# Patient Record
(demographics unavailable — no encounter records)

---

## 2024-10-24 NOTE — HISTORY OF PRESENT ILLNESS
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 She is currently on azithromycin for 5 days. She is on budesonide nebulizers 3 times daily and levalbuterol nebulizers. Reactive airway disease for long time but no formal diagnosis of asthma. Longstanding history of tachycardia. Prior history of COVID infection complicated by chronic fatigue. No recent COVID infection she had home testing that was negative.

## 2024-10-24 NOTE — ASSESSMENT
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 Respiratory viral panel was done that was negative. Patient is currently on azithromycin  She continues to have harsh cough that interferes with sleep work and has caused chest discomfort. I ordered a chest x-ray she has history of tachycardia she takes [place] Thyroid and also takes medications for ADHD  In the meantime patient will continue azithromycin Short burst of oral prednisone for reactive airway Hold off budesonide because of risk of oral thrush and laryngitis Continue levalbuterol    . Today I reviewed the chest x-ray I discussed result with the patient there is silhouetting of the right diaphragmatic border consistent with pulmonary infiltrate and pneumonia Problems. Right lower lobe infiltrate likely pneumonia RVP panel is negative Underlying history of/asthma/reactive airway  chronic tachycardia  Recommendations. Extend antibiotic duration I will start her on Augmentin for 7 days  continue oral prednisone for 5 days Cough suppressant with codeine every nightly for 3 days, risks and benefits discussed Hold budesonide Continue albuterol I offered follow-up CT scan of the chest, patient would like to hold off CT scan but instead do a chest x-ray. She has family history of lung cancer in her mother Short interval follow-up in 6 weeks or earlier if symptomatic Patient is traveling to [place] Patient has my email and she can contact if needed

## 2024-10-24 NOTE — PHYSICAL EXAM
[No Acute Distress] : no acute distress [Well Nourished] : well nourished [Well Developed] : well developed [Normal Oropharynx] : normal oropharynx [Erythema] : erythema [I] : Mallampati Class: I [Normal Appearance] : normal appearance [Supple] : supple [No Neck Mass] : no neck mass [No JVD] : no jvd [Normal Rate/Rhythm] : normal rate/rhythm [Normal S1, S2] : normal s1, s2 [No Resp Distress] : no resp distress [Clear to Auscultation Bilaterally] : clear to auscultation bilaterally [No Abnormalities] : no abnormalities [Normal Gait] : normal gait [No Clubbing] : no clubbing [No Cyanosis] : no cyanosis [No Edema] : no edema [Normal Color/ Pigmentation] : normal color/ pigmentation [No Rash] : no rash [No Focal Deficits] : no focal deficits [Oriented x3] : oriented x3 [Normal Affect] : normal affect [TextBox_54] : Tachycardia

## 2024-10-24 NOTE — ASSESSMENT
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 Respiratory viral panel was done that was negative. Patient is currently on azithromycin  She continues to have harsh cough that interferes with sleep work and has caused chest discomfort. I ordered a chest x-ray she has history of tachycardia she takes [place] Thyroid and also takes medications for ADHD  In the meantime patient will continue azithromycin Short burst of oral prednisone for reactive airway Hold off budesonide because of risk of oral thrush and laryngitis Continue levalbuterol    . Today I reviewed the chest x-ray I discussed result with the patient there is silhouetting of the right diaphragmatic border consistent with pulmonary infiltrate and pneumonia Problems. Right lower lobe infiltrate likely pneumonia RVP panel is negative Underlying history of/asthma/reactive airway  chronic tachycardia  Recommendations. Extend antibiotic duration I will start her on Augmentin for 7 days  continue oral prednisone for 5 days Cough suppressant with codeine every nightly for 3 days, risks and benefits discussed Hold budesonide Continue albuterol I offered follow-up CT scan of the chest, patient would like to hold off CT scan but instead do a chest x-ray. She has family history of lung cancer in her mother Short interval follow-up in 6 weeks or earlier if symptomatic Patient is traveling to [place] Patient has my email and she can contact if needed

## 2024-10-24 NOTE — REVIEW OF SYSTEMS
[Fever] : fever [Fatigue] : fatigue [Dry Eyes] : dry eyes [Sore Throat] : sore throat [Nasal Congestion] : nasal congestion [Postnasal Drip] : postnasal drip [Cough] : cough [Sputum] : sputum [Pleuritic Pain] : pleuritic pain [Chest Discomfort] : chest discomfort [Seasonal Allergies] : seasonal allergies [GERD] : gerd [Nausea] : nausea [Food Intolerance] : food intolerance [Back Pain] : back pain [Negative] : Endocrine [Recent Wt Gain (___ Lbs)] : ~T no recent weight gain [Chills] : no chills [Poor Appetite] : no poor appetite [Ear Disturbance] : no ear disturbance [Epistaxis] : no epistaxis [Eye Irritation] : no eye irritation [Dry Mouth] : no dry mouth [Sinus Problems] : no sinus problems [Mouth Ulcers] : no mouth ulcers [Poor Dentition] : no poor dentition [Edentulous] : no edentulous [Hemoptysis] : no hemoptysis [Chest Tightness] : no chest tightness [Frequent URIs] : no frequent URIs [Dyspnea] : no dyspnea [Wheezing] : no wheezing [A.M. Dry Mouth] : no a.m. dry mouth [SOB on Exertion] : no sob on exertion [Claudication] : no claudication [Edema] : no edema [Orthopnea] : no orthopnea [Palpitations] : no palpitations [Phlebitis] : no phlebitis [Syncope] : no syncope [Hay Fever] : no hay fever [Watery Eyes] : no watery eyes [Itchy Eyes] : no itchy eyes [Nasal Discharge] : no nasal discharge [Hives] : no hives [Angioedema] : no angioedema [Abdominal Pain] : no abdominal pain [Diarrhea] : no diarrhea [Dysphagia] : no dysphagia [Hepatic Disease] : no hepatic disease [Arthralgias] : no arthralgias [Myalgias] : no myalgias [Fracture] : no fracture [Trauma/ Injury] : no trauma/ injury [Chronic Pain] : no chronic pain

## 2024-12-08 NOTE — REVIEW OF SYSTEMS
[Fever] : fever [Fatigue] : fatigue [Dry Eyes] : dry eyes [Sore Throat] : sore throat [Nasal Congestion] : nasal congestion [Postnasal Drip] : postnasal drip [Pleuritic Pain] : pleuritic pain [Chest Discomfort] : chest discomfort [Seasonal Allergies] : seasonal allergies [GERD] : gerd [Nausea] : nausea [Food Intolerance] : food intolerance [Back Pain] : back pain [Negative] : Endocrine [Recent Wt Gain (___ Lbs)] : ~T no recent weight gain [Chills] : no chills [Poor Appetite] : no poor appetite [Ear Disturbance] : no ear disturbance [Epistaxis] : no epistaxis [Eye Irritation] : no eye irritation [Dry Mouth] : no dry mouth [Sinus Problems] : no sinus problems [Mouth Ulcers] : no mouth ulcers [Poor Dentition] : no poor dentition [Edentulous] : no edentulous [Cough] : no cough [Hemoptysis] : no hemoptysis [Chest Tightness] : no chest tightness [Frequent URIs] : no frequent URIs [Sputum] : no sputum [Dyspnea] : no dyspnea [Wheezing] : no wheezing [A.M. Dry Mouth] : no a.m. dry mouth [SOB on Exertion] : no sob on exertion [Claudication] : no claudication [Edema] : no edema [Orthopnea] : no orthopnea [Palpitations] : no palpitations [Phlebitis] : no phlebitis [Syncope] : no syncope [Hay Fever] : no hay fever [Watery Eyes] : no watery eyes [Itchy Eyes] : no itchy eyes [Nasal Discharge] : no nasal discharge [Hives] : no hives [Angioedema] : no angioedema [Abdominal Pain] : no abdominal pain [Diarrhea] : no diarrhea [Dysphagia] : no dysphagia [Hepatic Disease] : no hepatic disease [Arthralgias] : no arthralgias [Myalgias] : no myalgias [Fracture] : no fracture [Trauma/ Injury] : no trauma/ injury [Chronic Pain] : no chronic pain

## 2024-12-08 NOTE — PROCEDURE
[FreeTextEntry1] : Pulmonary function test show normal spirometry normal lung volumes normal DLCO. CT scan of the chest images reviewed right lower lobe bandlike atelectasis

## 2024-12-08 NOTE — ASSESSMENT
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  Previously Respiratory viral panel was done that was negative. Completed oral prednisone and antibiotics-cough has improved she has history of tachycardia she takes [place] Thyroid and also takes medications for ADHD She is on levalbuterol and multiple allergy medications Xyzal Allegra Flonase azelastine CT scan of the chest reviewed bandlike atelectasis of right lower lobe, -  Pulmonary function tests are unremarkable Clinical and radiographic improvement  Bandlike atelectasis Problems. Right lower lobe bandlike atelectasis likely sequela of  Right lower lobe infiltrate/ pneumonia Underlying history of/asthma/reactive airway chronic tachycardia  Recommendations. Okay to continue qvar Xopenex 2 puffs every 6 as needed Results of the CT scan of the chest and pulmonary function test discussed with patient Follow-up CT scan of the chest in 6 months , earlier follow-up if symptomatic continue follow-up with allergy

## 2024-12-08 NOTE — ASSESSMENT
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  Previously Respiratory viral panel was done that was negative. Completed oral prednisone and antibiotics-cough has improved she has history of tachycardia she takes [place] Thyroid and also takes medications for ADHD She is on levalbuterol and multiple allergy medications Xyzal Allegra Flonase azelastine CT scan of the chest reviewed bandlike atelectasis of right lower lobe, -  Pulmonary function tests are unremarkable Clinical and radiographic improvement  Bandlike atelectasis Problems. Right lower lobe bandlike atelectasis likely sequela of  Right lower lobe infiltrate/ pneumonia Underlying history of/asthma/reactive airway chronic tachycardia  Recommendations. Okay to continue qvar Xopenex 2 puffs every 6 as needed Results of the CT scan of the chest and pulmonary function test discussed with patient Follow-up CT scan of the chest in 6 months , earlier follow-up if symptomatic continue follow-up with allergy

## 2024-12-08 NOTE — HISTORY OF PRESENT ILLNESS
[Never] : never [TextBox_4] : Patient returns for follow-up. She is off oral prednisone of antibiotics. Reports slight improvement in cough No mucus production, no fever no chills no night sweats No exertional dyspnea or wheezing She returns with a pulmonary function test and CT scan of the chest No ER visits no hospitalizations for asthma.

## 2024-12-24 NOTE — PLAN
[TextEntry] : We discussed at length with the patient the options for treatment.  We discussed conservative care including physical therapy, acupuncture, massage therapy and chiropractic care.  We discussed injection therapy and even surgical intervention should the patient fail conservative care.  We discussed, risks, benefits, complications, alternatives, outcomes and expectations.   All questions answered.  Will obtain an MRI Thoracic spine to determine bone lesion???

## 2024-12-24 NOTE — PHYSICAL EXAM
[Normal Mood and Affect] : normal mood and affect [Able to Communicate] : able to communicate [Well Developed] : well developed [Well Nourished] : well nourished [NL (90)] : forward flexion 90 degrees [NL (30)] : right lateral bending 30 degrees [] : non-antalgic

## 2024-12-24 NOTE — HISTORY OF PRESENT ILLNESS
[de-identified] : 12/24/2024: Return visit for this 54 year old female, hx of prediabetes, who had pneumonia in October. She states she got a CT scan chest in  [facility] and reported ? abnormality in T10. Just wants to f/u on CT scan results. Her family member is a radiologist who suggested f/u with MRI scan.  PMH: Has a hx of occasional mid to LBP in the past. [] : no [de-identified] : 12/2/2024 [de-identified] : [facility] [de-identified] : CT scan

## 2025-01-06 NOTE — PHYSICAL EXAM
[Normal Mood and Affect] : normal mood and affect [Able to Communicate] : able to communicate [Well Developed] : well developed [Well Nourished] : well nourished [NL (90)] : forward flexion 90 degrees [NL (30)] : right lateral bending 30 degrees [] : non-antalgic [Fracture] : Fracture [FreeTextEntry1] : less than 25% compression fx superior endplate T12

## 2025-01-06 NOTE — HISTORY OF PRESENT ILLNESS
[2] : 2 [de-identified] : 01/06/25:  Returns for thoracic spine MRI results. Has noticed some T-L pain after a bear hug by her son.  IMPRESSION: 1.  Acute compression fracture of the superior endplate of T12 vertebra. 2.  T11-12 diffuse 2 mm bulge.  No canal stenosis.  No neuroforaminal narrowing.  The facet joints are within normal limits.  3.  Two hyperintense lesions in the T6, T7 and 10 vertebral bodies likely reflecting hemangiomas.  12/24/2024: Return visit for this 54 year old female, hx of prediabetes, who had pneumonia in October. She states she got a CT scan chest in  [facility] and reported ? abnormality in T10. Just wants to f/u on CT scan results. Her family member is a radiologist who suggested f/u with MRI scan.  PMH: Has a hx of occasional mid to LBP in the past. [] : no [FreeTextEntry5] : Reports worsening pain since last visit. Has been avoiding bending., lifting.

## 2025-01-06 NOTE — PLAN
[TextEntry] : The patient was advised of the diagnosis. The natural history of the pathology was explained in full to the patient in layman's terms. All questions were answered. The risks and benefits of surgical and non-surgical treatment alternatives were explained in full to the patient.  Will refrain from heavy lifting and bending activities  Will schedule a DEXA scan in near future

## 2025-01-28 NOTE — PHYSICAL EXAM
[No Acute Distress] : no acute distress [Well Nourished] : well nourished [Well Developed] : well developed [Normal Oropharynx] : normal oropharynx [Erythema] : erythema [II] : Mallampati Class: II [Normal Appearance] : normal appearance [Supple] : supple [No Neck Mass] : no neck mass [No JVD] : no jvd [Normal Rate/Rhythm] : normal rate/rhythm [Normal S1, S2] : normal s1, s2 [No Resp Distress] : no resp distress [Clear to Auscultation Bilaterally] : clear to auscultation bilaterally [No Abnormalities] : no abnormalities [Normal Gait] : normal gait [No Clubbing] : no clubbing [No Cyanosis] : no cyanosis [No Edema] : no edema [Normal Color/ Pigmentation] : normal color/ pigmentation [No Rash] : no rash [No Focal Deficits] : no focal deficits [Oriented x3] : oriented x3 [Normal Affect] : normal affect [TextBox_54] : Tachycardia

## 2025-01-28 NOTE — HISTORY OF PRESENT ILLNESS
[Never] : never [TextBox_4] : PATIENT SUMMARY: The patient presented with cough, congestion, and wheezing following a recent viral illness.  Pediatrician by occupation. Similar illness few months back that responded to codeine oral prednisone.   SUBJECTIVE: The patient reported experiencing wheezing after consuming an allergen on Monday, with subsequent development of a low-grade fever for two days starting Thursday, accompanied by cough and congestion. The wheezing initially improved but persisted mildly. The patient noted increased coughing during the daytime. There is no history of asthma, except related to viral infections or allergens. The patient used leftover codeine guaifenesin, which provided relief, but had run out. The patient has a history of normal pulmonary function tests. There is a sick contact, as the patient's  began feeling ill on Wednesday. The patient does not report smoking or vaping. Sick contact at home. Patient went to see [place]. She also has been other gatherings where people was sick. Home testing for flu and COVID were negative. Fever is resolved she denies any hemoptysis chest pain. She continues to be on Flovent and is gargling mouth after using Flovent  The patient has a history of osteoporosis, diagnosed through recent screening, and a T12 spine compression fracture noted on an MRI. This fracture was thought to be due to previous pneumonia-related pain exacerbated by activity. The patient has had prior episodes of pneumonia and describes an infiltrate in the right lung. The patient is concerned about potential allergies to a pet dog, contributing to respiratory symptoms. The patient has visited public events recently and suspects viral transmission.  Medications: Glucophage (metformin) extended-release 500mg, reduced to 1 tab due to decreased appetite, Flovent 2 puffs once daily, Zopinex as needed every four to six hours, famotidine for acid reflux. Allergies: Allergic to yellow gum. Family history: Mother had lung cancer. Social history: No smoking or vaping history.  OBJECTIVE: General Appearance: Patient appeared alert and oriented. Head/Neck: Examination performed, no lymphadenopathy noted. Ears: Tympanic membrane shiny on one side, blurred on the other, minimal wax noted. Cardiac, Respiratory: Examination performed, no wheezing noted. Integumentary: Examination performed, findings not provided.

## 2025-01-28 NOTE — DISCUSSION/SUMMARY
[FreeTextEntry1] : Previous pulmonary function test and chest x-ray and CT scan results discussed with patient. Patient has right lower lobe infiltrate on his CT scan and there is no further follow-up

## 2025-01-28 NOTE — REVIEW OF SYSTEMS
[Fever] : fever [Fatigue] : fatigue [Recent Wt Gain (___ Lbs)] : ~T no recent weight gain [Chills] : no chills [Poor Appetite] : no poor appetite [Recent Wt Loss (___ Lbs)] : ~T no recent weight loss [Dry Eyes] : dry eyes [Ear Disturbance] : ear disturbance [Epistaxis] : no epistaxis [Sore Throat] : sore throat [Eye Irritation] : no eye irritation [Nasal Congestion] : nasal congestion [Postnasal Drip] : postnasal drip [Dry Mouth] : dry mouth [Sinus Problems] : no sinus problems [Mouth Ulcers] : no mouth ulcers [Poor Dentition] : no poor dentition [Edentulous] : no edentulous [Cough] : cough [Hemoptysis] : no hemoptysis [Chest Tightness] : no chest tightness [Frequent URIs] : no frequent URIs [Sputum] : no sputum [Dyspnea] : no dyspnea [Pleuritic Pain] : pleuritic pain [Wheezing] : no wheezing [A.M. Dry Mouth] : no a.m. dry mouth [SOB on Exertion] : no sob on exertion [Chest Discomfort] : chest discomfort [Claudication] : no claudication [Edema] : no edema [Orthopnea] : no orthopnea [Palpitations] : no palpitations [Phlebitis] : no phlebitis [Syncope] : no syncope [Hay Fever] : no hay fever [Watery Eyes] : no watery eyes [Itchy Eyes] : no itchy eyes [Seasonal Allergies] : seasonal allergies [Nasal Discharge] : no nasal discharge [Hives] : no hives [Angioedema] : no angioedema [GERD] : gerd [Abdominal Pain] : no abdominal pain [Nausea] : nausea [Diarrhea] : no diarrhea [Dysphagia] : no dysphagia [Food Intolerance] : food intolerance [Hepatic Disease] : no hepatic disease [Arthralgias] : no arthralgias [Myalgias] : no myalgias [Back Pain] : back pain [Fracture] : no fracture [Trauma/ Injury] : no trauma/ injury [Chronic Pain] : no chronic pain [Negative] : Endocrine

## 2025-01-28 NOTE — ASSESSMENT
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 She has mild asthma; hyppohyroid state; osteoporosis-and recurrent upper respiratory tract infection. She has underlying gastroesophageal reflux disease seasonal allergies. She returns for follow-up today Previously had pneumonitis right lower lobe infiltrate and was treated with antibiotics prednisone and cough suppressant.  She returns with similar symptoms but has been exposed to sick contacts and has been having flulike illness.  Flu testing in the office was negative.  COVID testing in the office was negative Most likely  URI, with throat congestion ear congestion. Persistent pulmonary infiltrate possible , likely from GERD or other underlying etiologies which will require follow-up CT scan of the chest.  Inhaled steroid induced laryngitis is possible  ASSESSMENT:  1. Upper Respiratory Tract Infection: The patient presented with symptoms consistent with a viral upper respiratory infection, including cough, congestion, and wheezing. The patient's recent exposure to public events and close contact with a sick family member supports this assessment.  2. Osteoporosis with T12 Compression Fracture: The patient has a known diagnosis of osteoporosis and a T12 compression fracture identified on MRI. The patient's history of osteoporosis is being evaluated further with endocrinology follow-up.  3.  Persistent pulmonary pulmonary infiltrate , family history of lung cancer, patient is a non-smoker, signs and symptoms are not suggestive of malignant disease-but is definitely requires a radiographic surveillance and CT scan for comparison previous.   PLAN:  Treatment: - Medications: Prescribed a Z-Curt (azithromycin) for potential bacterial involvement. Codeine guaifenesin for cough suppression. Switch from Flovent to Spiriva (tiotropium) for non-steroidal bronchodilator management. - Continue famotidine for acid reflux. -If no improvement will need prednisone; no dose for few days will not cause too much difference in osteoporosis -Okay to hold off budesonide.  Continue albuterol 2 puffs every 6 as needed -Patient is also on antiallergy medications  -Patient takes PPI   Tests: - CT scan scheduled in six weeks for follow-up of lung infiltrate. - Standby prescription for chest x-ray if symptoms worsen.  Patient Education: - Advised to maintain hydration and consider local honey for potential allergy benefits. - Informed about the potential impact of long-term inhaled corticosteroids on bone health.  Follow-Up: - Scheduled follow-up visit in two months post-CT scan. - Advised to contact if symptoms worsen or do not improve.  Disposition: - Patient instructed to take the prescription for an x-ray in case of need.  I have discussed the need of follow-up CT scan with the patient She will do CT scan in 6 weeks

## 2025-02-11 NOTE — HISTORY OF PRESENT ILLNESS
[de-identified] : 02/11/25:  Returns for follow-up of T12 compression fracture.  Still has some pain when bending, otherwise doing well. had a DEXA scan which she states was c/w osteoporosis.  01/06/25:  Returns for thoracic spine MRI results. Has noticed some T-L pain after a bear hug by her son.  IMPRESSION: 1.  Acute compression fracture of the superior endplate of T12 vertebra. 2.  T11-12 diffuse 2 mm bulge.  No canal stenosis.  No neuroforaminal narrowing.  The facet joints are within normal limits.  3.  Two hyperintense lesions in the T6, T7 and 10 vertebral bodies likely reflecting hemangiomas.  12/24/2024: Return visit for this 54 year old female, hx of prediabetes, who had pneumonia in October. She states she got a CT scan chest in  [facility] and reported ? abnormality in T10. Just wants to f/u on CT scan results. Her family member is a radiologist who suggested f/u with MRI scan.  PMH: Has a hx of occasional mid to LBP in the past. [] : no [FreeTextEntry5] : Reports worsening pain since last visit. Has been avoiding bending., lifting.

## 2025-02-11 NOTE — PHYSICAL EXAM
[] : non-antalgic [FreeTextEntry1] : less than 25% compression fx superior endplate T12. Unchanged from previous xray. Stable and healed

## 2025-02-11 NOTE — HISTORY OF PRESENT ILLNESS
[de-identified] : 02/11/25:  Returns for follow-up of T12 compression fracture.  Still has some pain when bending, otherwise doing well. had a DEXA scan which she states was c/w osteoporosis.  01/06/25:  Returns for thoracic spine MRI results. Has noticed some T-L pain after a bear hug by her son.  IMPRESSION: 1.  Acute compression fracture of the superior endplate of T12 vertebra. 2.  T11-12 diffuse 2 mm bulge.  No canal stenosis.  No neuroforaminal narrowing.  The facet joints are within normal limits.  3.  Two hyperintense lesions in the T6, T7 and 10 vertebral bodies likely reflecting hemangiomas.  12/24/2024: Return visit for this 54 year old female, hx of prediabetes, who had pneumonia in October. She states she got a CT scan chest in  [facility] and reported ? abnormality in T10. Just wants to f/u on CT scan results. Her family member is a radiologist who suggested f/u with MRI scan.  PMH: Has a hx of occasional mid to LBP in the past. [] : no [FreeTextEntry5] : Reports worsening pain since last visit. Has been avoiding bending., lifting.

## 2025-04-07 NOTE — ADDENDUM
[FreeTextEntry1] : Documented by Deirdre Seth acting as a scribe for Dr. Yahir Leslie on 04/07/2025. All medical record entries made by the Scribe were at my, Dr. Yahir Leslie's, direction and personally dictated by me on 04/07/2025. I have reviewed the chart and agree that the record accurately reflects my personal performance of the history, physical exam, assessment and plan. I have also personally directed, reviewed, and agree with the discharge instructions.

## 2025-04-07 NOTE — PROCEDURE
[FreeTextEntry1] : Patient refused CXR  Full PFT reveals normal flows; FEV1 was 2.30L which is 99% of predicted, with no change on BD; normal lung volumes; normal diffusion at 18.23, which is 95% of predicted; normal flow volume loop. RESHMA test revealed moderately reduced MIP max of 37%; normal MEP max of 80%  PFTs were performed to evaluate for asthma  6 minute walk test reveals a low saturation of 94%, max ; walked 423.8 meters   FENO was 16; a normal value being less than 25 Fractional exhaled nitric oxide (FENO) is regarded as a simple, noninvasive method for assessing eosinophilic airway inflammation. Produced by a variety of cells within the lung, nitric oxide (NO) concentrations are generally low in healthy individuals. However, high concentrations of NO appear to be involved in nonspecific host defense mechanisms and chronic inflammatory diseases such as asthma. The American Thoracic Society (ATS) therefore has recommended using FENO to aid in the diagnosis and monitoring of eosinophilic airway inflammation and asthma, and for identifying steroid responsive individuals whose chronic respiratory symptoms may be caused by airway inflammation.

## 2025-04-07 NOTE — PHYSICAL EXAM
[No Acute Distress] : no acute distress [Normal Oropharynx] : normal oropharynx [Normal Appearance] : normal appearance [No Neck Mass] : no neck mass [Normal Rate/Rhythm] : normal rate/rhythm [Normal S1, S2] : normal s1, s2 [No Resp Distress] : no resp distress [Clear to Auscultation Bilaterally] : clear to auscultation bilaterally [No Abnormalities] : no abnormalities [Benign] : benign [Normal Gait] : normal gait [No Clubbing] : no clubbing [No Cyanosis] : no cyanosis [No Edema] : no edema [FROM] : FROM [Normal Color/ Pigmentation] : normal color/ pigmentation [No Focal Deficits] : no focal deficits [Oriented x3] : oriented x3 [Normal Affect] : normal affect [III] : Mallampati Class: III [TextBox_54] : 2/6 systolic murmur [TextBox_68] : I:E ratio 1:3; clear

## 2025-04-07 NOTE — HISTORY OF PRESENT ILLNESS
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 She's had it for 5 years, and she's tolerating it well -she notes weight is stable  -she notes exercising (Pilates, walking, weight training) -she notes she's been using Xopenex more frequently -she notes balance is stable  -she notes GERD is associated with her PNDrip  -she denies any nausea, emesis, fever, chills, sweats, chest pain, chest pressure, coughing, palpitations, constipation, diarrhea, vertigo, dysphagia, itchy eyes, itchy ears, leg swelling, leg pain, arthralgias, myalgias, or sour taste in the mouth.

## 2025-04-07 NOTE — ASSESSMENT
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[place], nonsmoker, pediatrician with a history of arthritis of left knee, COVID-19 x2 (9/2024) complicated by PNA and vertebral fracture, subsequent diagnosis of osteoporosis, ADHD, PCOS on metformin, ovarian cyst s/p ovarian cystectomy, hypothyroidism, asthma, maternal family history of lung cancer, sleep apnea who now comes to the office for an initial pulmonary evaluation for SOB, mild intermittent asthma, allergies/sinus, GERD, CLARISSA using DD, abnormal CT c/w scarring related to prior PNA 12/2024   Her shortness of breath is multifactorial due to: -poor mechanics of breathing -out of shape -overweight -pulmonary disease   -mild intermittent asthma   -RESHMA weakness -cardiac disease    -doubtful   Problem 1: mild intermittent asthma -add Symbicort 160 2 inhalations BID with a spacer -add Airsupra 2 inhalations Q6H PRN  -Asthma is believed to be caused by inherited (genetic) and environmental factor, but its exact cause is unknown. Asthma may be triggered by allergens, lung infections, or irritants in the air. Asthma triggers are different for each person.  -Inhaler technique reviewed as well as oral hygiene techniques reviewed with patient. Avoidance of cold air, extremes of temperature, rescue inhaler should be used before exercise. Order of medication reviewed with patient. Recommended adequate hydration and use of a cool mist humidifier in the bedroom   Problem 1A: RESHMA weakness -recommended the Breather (30 reps, 2X per day) -revealed by either reduction in a patient's maximum inspiratory pressure (PI max) or maximum expiratory pressure (PE max), or both measured at the mouth. This can be related to a variety of medical issues such as neuromuscular disease, thyroid/parathyroid diseases, infections, poor nutrition, etc.    Problem 2: allergies/sinus -continue Allegra 180 mg QAM  -continue Zyrtec 10 mg QHS  -use simply saline -followed by Flonase 1 sniff/nostril BID  -followed by Astelin 0.10% 1 sniff/nostril BID  -complete blood work: asthma panel, food IgE panel, IgE level, eosinophil level, vitamin D level  -Environmental measures for allergies were encouraged including mattress and pillow covers, air purifier, and environmental controls.    Problem 3: GERD -continue Pepcid 40 mg QHS  -Rule of 2s: avoid eating too much, eating too late, eating too spicy, eating two hours before bed. -Things to avoid including overeating, spicy foods, tight clothing, eating within three hours of bed, this list is not all inclusive. -For treatment of reflux, possible options discussed including diet control, H2 blockers, PPIs, as well as coating motility agents discussed as treatment options. Timing of meals and proximity of last meal to sleep were discussed. If symptoms persist, a formal gastrointestinal evaluation is needed.    Problem 4: CLARISSA on DD -continue using DD- tolerating well and benefiting -Sleep apnea is associated with adverse clinical consequences which can affect most organ systems. Cardiovascular disease risk includes arrhythmias, atrial fibrillation, hypertension, coronary artery disease, and stroke. Metabolic disorders include diabetes type 2, non-alcoholic fatty liver disease. Mood disorder especially depression; and cognitive decline especially in the elderly. Associations with chronic reflux/Brady's esophagus some but not all inclusive. -Reasons include arousal consistent with hypopnea; respiratory events most prominent in REM sleep or supine position; therefore sleep staging and body position are important for accurate diagnosis and estimation of AHI.   Problem 5: abnormal CT c/w scarring related to prior PNA 12/2024 -complete LDCT chest 6/2025 -CAT scans are the only radiological modality to identify abnormalities within the lungs with regards to nodules/masses/lymph nodes. Risks, benefits were reviewed in detail. The guidelines for abnormalities include follow up CT scans at various intervals which could range from 6 weeks to 1 year intervals. If there is a change for the worse then consideration for a biopsy will be considered if the patient is a candidate. Second opinion evaluation with a thoracic surgeon or an interventional radiologist could be offered.    Problem 6: cardiac disease -recommended to follow up with Cardiologist if needed   Problem 7: poor breathing mechanics -Recommended Calista Mayberry and [place]assie breathing technique -Proper breathing techniques were reviewed with an emphasis on exhalation. Patient was instructed to breathe in for 1 second and out for 4 seconds. The patient was encouraged to not talk while walking.   Problem 8: overweight/ out of shape -Weight loss, exercise, and diet control were discussed and are highly encouraged. Treatment options are given such as aqua therapy, and contacting a nutritionist. Recommended to use the elliptical, stationary bike, less use of the treadmill.   Problem 9: health maintenance -s/p yearly flu shot 2024 -recommended strep pneumonia vaccines: Prevnar-20 vaccine after the age of 65 -recommended early intervention for Upper Respiratory Infections (URIs) -recommended regular osteoporosis evaluations -recommended early dermatological evaluations -recommended after the age of 50 to the age of 70, colonoscopy every 5 years   F/P in 6-8 weeks. She is encouraged to call with any changes, concerns, or questions

## 2025-04-07 NOTE — REASON FOR VISIT
[Initial] : an initial visit [TextBox_44] : SOB, mild intermittent asthma, allergies/sinus, GERD, CLARISSA using DD, abnormal CT

## 2025-05-06 NOTE — PHYSICAL EXAM
[Alert] : alert [Well Nourished] : well nourished [No Acute Distress] : no acute distress [Well Developed] : well developed [Normal Sclera/Conjunctiva] : normal sclera/conjunctiva [EOMI] : extra ocular movement intact [No Proptosis] : no proptosis [Normal Oropharynx] : the oropharynx was normal [Thyroid Not Enlarged] : the thyroid was not enlarged [No Thyroid Nodules] : no palpable thyroid nodules [No Respiratory Distress] : no respiratory distress [No Accessory Muscle Use] : no accessory muscle use [Clear to Auscultation] : lungs were clear to auscultation bilaterally [Normal S1, S2] : normal S1 and S2 [Normal Rate] : heart rate was normal [Regular Rhythm] : with a regular rhythm [No Edema] : no peripheral edema [Pedal Pulses Normal] : the pedal pulses are present [Normal Bowel Sounds] : normal bowel sounds [Not Tender] : non-tender [Not Distended] : not distended [Soft] : abdomen soft [Normal Anterior Cervical Nodes] : no anterior cervical lymphadenopathy [Normal Posterior Cervical Nodes] : no posterior cervical lymphadenopathy [No Spinal Tenderness] : no spinal tenderness [Spine Straight] : spine straight [No Stigmata of Cushings Syndrome] : no stigmata of Cushings Syndrome [Normal Gait] : normal gait [Normal Strength/Tone] : muscle strength and tone were normal [No Rash] : no rash [Acanthosis Nigricans] : no acanthosis nigricans [Normal Reflexes] : deep tendon reflexes were 2+ and symmetric [No Tremors] : no tremors [Oriented x3] : oriented to person, place, and time [No Murmurs] : no murmurs

## 2025-05-06 NOTE — HISTORY OF PRESENT ILLNESS
[FreeTextEntry1] : 55 year old female present for initial osteoporosis evaluation  was referred by her GYN due to T-12 compression fracture Oct 2024, after coughing.  DEXA: Jan 2025 LS:-3.6 , L total hip:-3.3 , FN: -3.2, R TH: -3.1 FN: -3.2  was seeing endocrinologist that recommended Eventify , insurance company denied. The patient has no unusual risk factors for osteoporosis although there is a second-degree relative with osteogenesis imperfecta Endocrine history is notable for hypothyroidism being managed by another physician with [place] Thyroid 15 mg/day.  Thyroid function tests are normal on this dose.  I do not have baseline testing.  Patient also has prediabetes currently on Glucophage XR brand-name from [facility] 1000 mg. Menopause since the age of 51 , started at the age of 12 with monthly regular. no HRT  Symptoms: only  vaginal dryness Mammogram: UTD  History of breast cancer: no personal history of breast cancer, breast cancer strong history on paternal side.   Exercise daily Pilates, walks  Fractures:T-12 fracture after coughing , was sick with pneumonia. Fhx: not known maternal grandmother and uncle had Osteogenesis imperfecta.   Calcium: oat milk, prunes, spinach  Vit D: supplements 5000 daily  Gluten free diet. taking Metformin 1000 daily  Denies history of kidney stones, excessive alcohol intake, excessive soda intake, celiac disease, malabsorption, nutritional deficiencies, GIB, stomach ulcers. Denies active smoking. Denies Paget's Dz, hyperparathyroidism, CVD, blood clots, and chronic steroid use.  history of asthma  ADHD  GERD   Sees DDS every 6 months. No major dental work planned.

## 2025-05-06 NOTE — HISTORY OF PRESENT ILLNESS
[FreeTextEntry1] : 55 year old female present for initial osteoporosis evaluation  was referred by her GYN due to T-12 compression fracture Oct 2024, after coughing.  DEXA: Jan 2025 LS:-3.6 , L total hip:-3.3 , FN: -3.2, R TH: -3.1 FN: -3.2  was seeing endocrinologist that recommended Eventify , insurance company denied. The patient has no unusual risk factors for osteoporosis although there is a second-degree relative with osteogenesis imperfecta Endocrine history is notable for hypothyroidism being managed by another physician with [place] Thyroid 15 mg/day.  Thyroid function tests are normal on this dose.  I do not have baseline testing.  Patient also has prediabetes currently on Glucophage XR brand-name from [facility]etta 1000 mg. Menopause since the age of 51 , started at the age of 12 with monthly regular. no HRT  Symptoms: only  vaginal dryness Mammogram: UTD  History of breast cancer: no personal history of breast cancer, breast cancer strong history on paternal side.   Exercise daily Pilates, walks  Fractures:T-12 fracture after coughing , was sick with pneumonia. Fhx: not known maternal grandmother and uncle had Osteogenesis imperfecta.   Calcium: oat milk, prunes, spinach  Vit D: supplements 5000 daily  Gluten free diet. taking Metformin 1000 daily  Denies history of kidney stones, excessive alcohol intake, excessive soda intake, celiac disease, malabsorption, nutritional deficiencies, GIB, stomach ulcers. Denies active smoking. Denies Paget's Dz, hyperparathyroidism, CVD, blood clots, and chronic steroid use.  history of asthma  ADHD  GERD   Sees DDS every 6 months. No major dental work planned.

## 2025-05-06 NOTE — ASSESSMENT
[Teriparatide/Abaloparatide Therapy] : Risks and benefits of Teriparatide/Abaloparatide therapy were discussed with the patient including potential risk of osteosarcoma [FreeTextEntry1] : A generally healthy 55 year old female present for initial evaluation of Osteoporosis  - Nov 2025, T-12 compression fracture after coughing  - DEXA: Jan 2025 LS:-3.6 , L total hip:-3.3 , FN: -3.2, R TH: -3.1 FN: -3.2  -maternal grandmother and uncle had Osteogenesis imperfecta. - was seeing by another Endocrinologist that recommended Evenity denied by insurance, patient would like to try again to resubmit prescription.   I have recommended anabolic therapy as initial therapy for best increase in bone density and reduction of fracture followed by standard  antiresorptive therapy.  Options of Forteo, Tymlos, or Evenity discussed in detail.  Risks and benefits discussed in detail.  .  Forteo and Tymlos risks and benefits discussed including blood Ca elevation, lightheadedness, palpitations, or dizziness. In animal models long term use has shown increased risk for bone Ca, though never seen in humans. Risks and benefits of Evenity discussed in great detail including concerns of cardiovascular risk.  The patient has no specific contraindication to PTH analogs such as Paget's disease or radiation therapy.  The patient has no specific contraindication to Evenity such as active ischemic heart disease.  Pt would be on anabolic therapy for 1 to 2 years , 1 year on Evenity , followed by another osteoporosis Rx such as bisphosphonate therapy or Prolia.    The patient would follow anabolic therapy with another osteoporosis Rx (bisphosphonate, Prolia) to prevent bone loss.   All questions were answered. Rx information handout provided. The patient understands and elects to start Evenity, will submit another prescription for pre authorization process , if will denied by insurance company we will need to try Forteo or Tymlos , Discussed with patient.    I discussed that weight bearing exercises, cardiovascular activities, and diet are beneficial to overall health, but are not adequate for bone density increase or alternative to osteoporosis medication.   I recommend supplementing with no more than 500 mg of Ca and 1000 IU of Vit D3 QD pending labs.  patient is taking [place] Thyroid 15 mg   Glucophage XR 1000mg   Labs 01/2025  TSH: 1.61, T4: 1.28, Ca: 9.7, PTH: 38, Vit D: 45, Cr: 0.75, A1c: 5.2%    Follow up will be determined after pre authorization process of evenity. Repeat BMD in 1 year after starting therapy.   Júnior F, Evelia EM, Yung R, Heiu MD, Vaughn De Dante S, Eriberto B, Silas Y, Di GE, Juan DP, Oliver MORLEYT, Delroy JL, Petrona AM, Bri A, Basilio P, Kamar S, Sigifredo F, Glenys W, Llanos SR. Goal-directed osteoporosis treatment: ASBMR/BHOF task force position statement 2024. J Bone [place] Res. 2024 Sep 26;39(10):7664-5651.

## 2025-05-06 NOTE — REASON FOR VISIT
[Consultation] : a consultation visit [Osteoporosis] : osteoporosis [FreeTextEntry2] : Dr Caity Chen GYN

## 2025-05-06 NOTE — ASSESSMENT
[Teriparatide/Abaloparatide Therapy] : Risks and benefits of Teriparatide/Abaloparatide therapy were discussed with the patient including potential risk of osteosarcoma [FreeTextEntry1] : A generally healthy 55 year old female present for initial evaluation of Osteoporosis  - Nov 2025, T-12 compression fracture after coughing  - DEXA: Jan 2025 LS:-3.6 , L total hip:-3.3 , FN: -3.2, R TH: -3.1 FN: -3.2  -maternal grandmother and uncle had Osteogenesis imperfecta. - was seeing by another Endocrinologist that recommended Evenity denied by insurance, patient would like to try again to resubmit prescription.   I have recommended anabolic therapy as initial therapy for best increase in bone density and reduction of fracture followed by standard  antiresorptive therapy.  Options of Forteo, Tymlos, or Evenity discussed in detail.  Risks and benefits discussed in detail.  .  Forteo and Tymlos risks and benefits discussed including blood Ca elevation, lightheadedness, palpitations, or dizziness. In animal models long term use has shown increased risk for bone Ca, though never seen in humans. Risks and benefits of Evenity discussed in great detail including concerns of cardiovascular risk.  The patient has no specific contraindication to PTH analogs such as Paget's disease or radiation therapy.  The patient has no specific contraindication to Evenity such as active ischemic heart disease.  Pt would be on anabolic therapy for 1 to 2 years , 1 year on Evenity , followed by another osteoporosis Rx such as bisphosphonate therapy or Prolia.    The patient would follow anabolic therapy with another osteoporosis Rx (bisphosphonate, Prolia) to prevent bone loss.   All questions were answered. Rx information handout provided. The patient understands and elects to start Evenity, will submit another prescription for pre authorization process , if will denied by insurance company we will need to try Forteo or Tymlos , Discussed with patient.    I discussed that weight bearing exercises, cardiovascular activities, and diet are beneficial to overall health, but are not adequate for bone density increase or alternative to osteoporosis medication.   I recommend supplementing with no more than 500 mg of Ca and 1000 IU of Vit D3 QD pending labs.  patient is taking [place] Thyroid 15 mg   Glucophage XR 1000mg   Labs 01/2025  TSH: 1.61, T4: 1.28, Ca: 9.7, PTH: 38, Vit D: 45, Cr: 0.75, A1c: 5.2%    Follow up will be determined after pre authorization process of evenity. Repeat BMD in 1 year after starting therapy.   Júnior F, Evelia EM, Yung R, Hieu MN, Vaughn De Dante S, Eriberto B, Silas Y, Di GE, Juan DP, Oliver MORLEYT, Delroy JL, Petrona AM, Bri A, Basilio P, Kamar S, Sigifredo F, Glenys W, Llanos SR. Goal-directed osteoporosis treatment: ASBMR/BHOF task force position statement 2024. J Bone [place] Res. 2024 Sep 26;39(10):0668-6235.

## 2025-05-06 NOTE — CONSULT LETTER
[Dear  ___] : Dear  [unfilled], [Consult Letter:] : I had the pleasure of evaluating your patient, [unfilled]. [Please see my note below.] : Please see my note below. [Consult Closing:] : Thank you very much for allowing me to participate in the care of this patient.  If you have any questions, please do not hesitate to contact me. [FreeTextEntry2] : Gustavo Carolina MD [address] [place] [id number] [DrIsi  ___] : Dr. DUBON

## 2025-05-06 NOTE — CONSULT LETTER
[Dear  ___] : Dear  [unfilled], [Consult Letter:] : I had the pleasure of evaluating your patient, [unfilled]. [Please see my note below.] : Please see my note below. [Consult Closing:] : Thank you very much for allowing me to participate in the care of this patient.  If you have any questions, please do not hesitate to contact me. [FreeTextEntry2] : Gustavo Carolina MD [address] [place] [id number] [DrIsi  ___] : Dr. DUBON

## 2025-05-06 NOTE — PROCEDURE
[FreeTextEntry1] : BMD 01/2025 outside study  LS:-3.6 Osteoporosis,  L total hip:-3.3 Osteoporosis,  FN: -3.2 Osteoporosis  R TH: -3.1 Osteoporosis  FN: -3.2 Osteoporosis